# Patient Record
(demographics unavailable — no encounter records)

---

## 2024-10-22 NOTE — PHYSICAL EXAM
[Well Developed] : well developed [Well Nourished] : well nourished [No Acute Distress] : no acute distress [Normal Conjunctiva] : normal conjunctiva [Normal Venous Pressure] : normal venous pressure [No Carotid Bruit] : no carotid bruit [Normal S1, S2] : normal S1, S2 [No Murmur] : no murmur [No Rub] : no rub [No Gallop] : no gallop [Clear Lung Fields] : clear lung fields [Good Air Entry] : good air entry [No Respiratory Distress] : no respiratory distress  [Soft] : abdomen soft [Non Tender] : non-tender [No Masses/organomegaly] : no masses/organomegaly [Normal Bowel Sounds] : normal bowel sounds [Normal Gait] : normal gait [No Edema] : no edema [No Cyanosis] : no cyanosis [No Clubbing] : no clubbing [No Varicosities] : no varicosities [No Rash] : no rash [No Skin Lesions] : no skin lesions [Moves all extremities] : moves all extremities [Normal Speech] : normal speech [Memory Deficit] : memory deficit

## 2024-10-23 NOTE — CARDIOLOGY SUMMARY
[de-identified] : 10/22/2024: NSR, normal axis, normal intervals, (-) ST-T wave changes. ======================================================= [de-identified] : [facility] - 04/26/2024: CONCLUSIONS: 1. Left ventricular cavity is small. Left ventricular systolic function is mildly decreased with an ejection fraction visually estimated at 50 to 55 %. 2. The right ventricle is not well visualized. 3. Left atrium was not well visualized and LA volume could not be calculated. 4. Right atrium was not well visualized. 5. Mitral valve was not well visualized. 6. Tricuspid valve was not well visualized. 7. Pulmonic valve was not well visualized. 8. No significant valvular disease. 9. No pericardial effusion seen. 10. Technically difficult image quality. 11. The interatrial septum appears intact. =======================================================

## 2024-10-23 NOTE — HISTORY OF PRESENT ILLNESS
[FreeTextEntry1] : Of note, the patient is a poor historian and is unable to provide history.   RAS MCINTYRE is an 85-year-old female, with a PMHx significant for dementia, CHF, HTN, HLD, and recurrent kidney stones, who presents today from [facility] for a follow-up visit. Patient was last seen on 4/2024; at which time she had an echo done revealing mildly reduced EF of 50-55%. She was started on Lisinopril 2.5 mg. BP diary reveals BP has been better controlled.

## 2024-10-23 NOTE — HISTORY OF PRESENT ILLNESS
[FreeTextEntry1] : Of note, the patient is a poor historian and is unable to provide history.   RAS MCINTYRE is an 85-year-old female, with a PMHx significant for dementia, CHF, HTN, HLD, and recurrent kidney stones, who presents today from [facility] for a follow-up visit. Patient was last seen on 4/2024; at which time she had an echo done revealing mildly reduced EF of 50-55%. She was started on Lisinopril 2.5 mg. BP diary reveals BP has been better controlled.

## 2024-10-23 NOTE — CARDIOLOGY SUMMARY
[de-identified] : 10/22/2024: NSR, normal axis, normal intervals, (-) ST-T wave changes. ======================================================= [de-identified] : [facility] - 04/26/2024: CONCLUSIONS: 1. Left ventricular cavity is small. Left ventricular systolic function is mildly decreased with an ejection fraction visually estimated at 50 to 55 %. 2. The right ventricle is not well visualized. 3. Left atrium was not well visualized and LA volume could not be calculated. 4. Right atrium was not well visualized. 5. Mitral valve was not well visualized. 6. Tricuspid valve was not well visualized. 7. Pulmonic valve was not well visualized. 8. No significant valvular disease. 9. No pericardial effusion seen. 10. Technically difficult image quality. 11. The interatrial septum appears intact. =======================================================

## 2024-10-23 NOTE — DISCUSSION/SUMMARY
[EKG obtained to assist in diagnosis and management of assessed problem(s)] : EKG obtained to assist in diagnosis and management of assessed problem(s) [FreeTextEntry1] : EKG performed today was unremarkable.  CHF: The impression is congestive heart failure. Mildly reduced EF of 50-55% on prior echo. Repeat echocardiogram performed today revealed normal LV function. There are no changes in medication management. Continue current medical therapy.  HTN: The impression is hypertension. Currently, the condition is controlled. There are no changes in medication management. Continue current medical therapy. Other planned treatments include an exercise regimen, weight loss, low sodium diet, and alcohol moderation.  HLD: The impression is hyperlipidemia. Currently, the condition is stable. There are no changes in medication management. Continue current medical therapy. Other planned treatment includes diet modification, exercise, and weight loss.  Instructed to follow up in 9 months.  Plan was discussed with the patient.

## 2025-07-18 NOTE — DISCUSSION/SUMMARY
[EKG obtained to assist in diagnosis and management of assessed problem(s)] : EKG obtained to assist in diagnosis and management of assessed problem(s) [FreeTextEntry1] : EKG performed today was unremarkable.  CHF: EF of 55-60% on prior echo in 10/2024. There are no changes in medication management. Continue current medical therapy.  HTN: Currently, the condition is controlled. There are no changes in medication management. Continue current medical therapy. Other planned treatments include an exercise regimen, weight loss, low sodium diet, and alcohol moderation.  HLD: Currently, the condition is stable. There are no changes in medication management. Continue current medical therapy. Other planned treatment includes diet modification, exercise, and weight loss.  Instructed to follow up in 9 months.  Plan was discussed with the patient.

## 2025-07-18 NOTE — HISTORY OF PRESENT ILLNESS
[FreeTextEntry1] : Of note, the patient is a poor historian and is unable to provide history.   RAS MCINTYRE is an 86-year-old female, with a PMHx significant for dementia, CHF, HTN, HLD, and recurrent kidney stones, who presents today from [facility] for a follow-up visit. Last echo in 10/2024 revealed an EF of 55-60%. No new complaints.

## 2025-07-18 NOTE — CARDIOLOGY SUMMARY
[de-identified] : 07/17/2025: NSR, normal axis, normal intervals, (-) ST-T wave changes. 10/22/2024: NSR, normal axis, normal intervals, (-) ST-T wave changes. ======================================================= [de-identified] : [facility] - 10/22/2024: CONCLUSIONS: 1. Left ventricular cavity is small. Left ventricular systolic function is normal with an ejection fraction visually estimated at 55 to 60 %.  ======================================================= TTE - 04/26/2024: CONCLUSIONS: 1. Left ventricular cavity is small. Left ventricular systolic function is mildly decreased with an ejection fraction visually estimated at 50 to 55 %. 2. The right ventricle is not well visualized. 3. Left atrium was not well visualized and LA volume could not be calculated. 4. Right atrium was not well visualized. 5. Mitral valve was not well visualized. 6. Tricuspid valve was not well visualized. 7. Pulmonic valve was not well visualized. 8. No significant valvular disease. 9. No pericardial effusion seen. 10. Technically difficult image quality. 11. The interatrial septum appears intact. =======================================================

## 2025-07-18 NOTE — HISTORY OF PRESENT ILLNESS
[FreeTextEntry1] : Of note, the patient is a poor historian and is unable to provide history.   RAS MCINTYRE is an 86-year-old female, with a PMHx significant for dementia, CHF, HTN, HLD, and recurrent kidney stones, who presents today from [facility] for a follow-up visit. Last echo in 10/2024 revealed an EF of 55-60%. No new complaints.

## 2025-07-18 NOTE — CARDIOLOGY SUMMARY
[de-identified] : 07/17/2025: NSR, normal axis, normal intervals, (-) ST-T wave changes. 10/22/2024: NSR, normal axis, normal intervals, (-) ST-T wave changes. ======================================================= [de-identified] : [facility] - 10/22/2024: CONCLUSIONS: 1. Left ventricular cavity is small. Left ventricular systolic function is normal with an ejection fraction visually estimated at 55 to 60 %.  ======================================================= TTE - 04/26/2024: CONCLUSIONS: 1. Left ventricular cavity is small. Left ventricular systolic function is mildly decreased with an ejection fraction visually estimated at 50 to 55 %. 2. The right ventricle is not well visualized. 3. Left atrium was not well visualized and LA volume could not be calculated. 4. Right atrium was not well visualized. 5. Mitral valve was not well visualized. 6. Tricuspid valve was not well visualized. 7. Pulmonic valve was not well visualized. 8. No significant valvular disease. 9. No pericardial effusion seen. 10. Technically difficult image quality. 11. The interatrial septum appears intact. =======================================================